# Patient Record
Sex: FEMALE | Race: BLACK OR AFRICAN AMERICAN | NOT HISPANIC OR LATINO | Employment: FULL TIME | URBAN - METROPOLITAN AREA
[De-identification: names, ages, dates, MRNs, and addresses within clinical notes are randomized per-mention and may not be internally consistent; named-entity substitution may affect disease eponyms.]

---

## 2023-10-07 PROBLEM — R35.0 URINE FREQUENCY: Status: ACTIVE | Noted: 2023-10-07

## 2023-10-07 PROBLEM — F32.A DEPRESSION: Status: ACTIVE | Noted: 2023-10-07

## 2023-10-07 PROBLEM — N93.9 ABNORMAL UTERINE BLEEDING (AUB): Status: ACTIVE | Noted: 2023-10-07

## 2023-10-07 PROBLEM — N92.6 IRREGULAR MENSTRUAL CYCLE: Status: ACTIVE | Noted: 2023-10-07

## 2023-10-07 PROBLEM — L70.0 ACNE VULGARIS: Status: ACTIVE | Noted: 2021-08-03

## 2023-10-07 PROBLEM — R10.2 PAIN, PELVIC, FEMALE: Status: ACTIVE | Noted: 2023-10-07

## 2023-10-07 PROBLEM — R07.89 NON-CARDIAC CHEST PAIN: Status: ACTIVE | Noted: 2023-10-07

## 2023-10-07 PROBLEM — G47.30 SLEEP APNEA: Status: ACTIVE | Noted: 2023-10-07

## 2023-10-07 PROBLEM — N91.2 AMENORRHEA: Status: ACTIVE | Noted: 2023-10-07

## 2023-10-07 PROBLEM — M79.18 MUSCULOSKELETAL PAIN: Status: ACTIVE | Noted: 2023-10-07

## 2023-10-07 PROBLEM — R94.31 ABNORMAL ECG: Status: ACTIVE | Noted: 2023-10-07

## 2023-10-07 PROBLEM — R63.2 POLYPHAGIA: Status: ACTIVE | Noted: 2023-10-07

## 2023-10-07 PROBLEM — Z98.84 BARIATRIC SURGERY STATUS: Status: ACTIVE | Noted: 2023-10-07

## 2023-10-07 PROBLEM — K59.00 CONSTIPATION: Status: ACTIVE | Noted: 2023-10-07

## 2023-10-07 PROBLEM — E66.01 MORBID OBESITY (MULTI): Status: ACTIVE | Noted: 2023-10-07

## 2023-10-07 PROBLEM — E55.9 VITAMIN D DEFICIENCY: Status: ACTIVE | Noted: 2023-10-07

## 2023-10-07 RX ORDER — TRETINOIN 0.25 MG/G
1 CREAM TOPICAL
COMMUNITY
Start: 2021-08-03 | End: 2023-10-18 | Stop reason: ALTCHOICE

## 2023-10-07 RX ORDER — ASPIRIN 325 MG
1 TABLET, DELAYED RELEASE (ENTERIC COATED) ORAL
COMMUNITY
Start: 2022-04-20 | End: 2023-10-18 | Stop reason: ALTCHOICE

## 2023-10-07 RX ORDER — CLINDAMYCIN PHOSPHATE 10 UG/ML
1 LOTION TOPICAL
COMMUNITY
Start: 2021-08-03 | End: 2023-10-18 | Stop reason: ALTCHOICE

## 2023-10-07 RX ORDER — SERTRALINE HYDROCHLORIDE 25 MG/1
1 TABLET, FILM COATED ORAL DAILY
COMMUNITY
Start: 2022-04-06 | End: 2023-10-18 | Stop reason: ALTCHOICE

## 2023-10-07 RX ORDER — SENNOSIDES 8.6 MG/1
TABLET ORAL
COMMUNITY
Start: 2022-04-06 | End: 2023-10-18 | Stop reason: ALTCHOICE

## 2023-10-07 RX ORDER — POLYETHYLENE GLYCOL 3350 17 G/17G
17 POWDER, FOR SOLUTION ORAL DAILY
COMMUNITY
Start: 2022-04-06 | End: 2023-10-18 | Stop reason: ALTCHOICE

## 2023-10-09 ENCOUNTER — TELEPHONE (OUTPATIENT)
Dept: ENDOCRINOLOGY | Facility: CLINIC | Age: 33
End: 2023-10-09

## 2023-10-09 ENCOUNTER — ANCILLARY PROCEDURE (OUTPATIENT)
Dept: ENDOCRINOLOGY | Facility: CLINIC | Age: 33
End: 2023-10-09
Payer: COMMERCIAL

## 2023-10-09 ENCOUNTER — LAB (OUTPATIENT)
Dept: LAB | Facility: LAB | Age: 33
End: 2023-10-09
Payer: COMMERCIAL

## 2023-10-09 DIAGNOSIS — Z31.41 ENCOUNTER FOR FERTILITY TESTING: ICD-10-CM

## 2023-10-09 DIAGNOSIS — Z11.3 ENCOUNTER FOR SCREENING FOR INFECTIONS WITH A PREDOMINANTLY SEXUAL MODE OF TRANSMISSION: ICD-10-CM

## 2023-10-09 DIAGNOSIS — Z31.41 ENCOUNTER FOR FERTILITY TESTING: Primary | ICD-10-CM

## 2023-10-09 LAB
ABO GROUP (TYPE) IN BLOOD: NORMAL
ANTIBODY SCREEN: NORMAL
HCV AB SER QL: NONREACTIVE
HIV 1+2 AB+HIV1 P24 AG SERPL QL IA: NONREACTIVE
RH FACTOR (ANTIGEN D): NORMAL
TSH SERPL-ACNC: 1.15 MIU/L (ref 0.44–3.98)

## 2023-10-09 PROCEDURE — 86901 BLOOD TYPING SEROLOGIC RH(D): CPT

## 2023-10-09 PROCEDURE — 36415 COLL VENOUS BLD VENIPUNCTURE: CPT

## 2023-10-09 PROCEDURE — 76830 TRANSVAGINAL US NON-OB: CPT

## 2023-10-09 PROCEDURE — 86803 HEPATITIS C AB TEST: CPT

## 2023-10-09 PROCEDURE — 76830 TRANSVAGINAL US NON-OB: CPT | Performed by: OBSTETRICS & GYNECOLOGY

## 2023-10-09 PROCEDURE — 86900 BLOOD TYPING SEROLOGIC ABO: CPT

## 2023-10-09 PROCEDURE — 86850 RBC ANTIBODY SCREEN: CPT

## 2023-10-09 PROCEDURE — 86780 TREPONEMA PALLIDUM: CPT

## 2023-10-09 PROCEDURE — 87800 DETECT AGNT MULT DNA DIREC: CPT

## 2023-10-09 PROCEDURE — 87340 HEPATITIS B SURFACE AG IA: CPT

## 2023-10-09 PROCEDURE — 87389 HIV-1 AG W/HIV-1&-2 AB AG IA: CPT

## 2023-10-09 PROCEDURE — 83516 IMMUNOASSAY NONANTIBODY: CPT

## 2023-10-09 PROCEDURE — 84443 ASSAY THYROID STIM HORMONE: CPT

## 2023-10-09 NOTE — TELEPHONE ENCOUNTER
Attempted to return patient call. Detailed VM left for patient. Patient instructed that we will need a copy of varicella/Rubella vaccinations. Patient instructed to complete genetic waiver sent via engaged MD. Patient instructed to have partner complete semen analysis. Patient instructed to contact [facility] for consult. Patient instructed to keep follow up appointment with Dr. Benz.   KARY LOCO on 10/9/23 at 3:46 PM.

## 2023-10-10 LAB
C TRACH RRNA SPEC QL NAA+PROBE: NEGATIVE
HBV SURFACE AG SERPL QL IA: NONREACTIVE
N GONORRHOEA DNA SPEC QL PROBE+SIG AMP: NEGATIVE
T PALLIDUM AB SER QL: NONREACTIVE

## 2023-10-11 LAB — MIS SERPL-MCNC: 6.86 NG/ML (ref 0.18–11.71)

## 2023-10-18 ENCOUNTER — INITIAL PRENATAL (OUTPATIENT)
Dept: MATERNAL FETAL MEDICINE | Facility: CLINIC | Age: 33
End: 2023-10-18
Payer: COMMERCIAL

## 2023-10-18 VITALS — WEIGHT: 226.4 LBS | BODY MASS INDEX: 42.78 KG/M2 | SYSTOLIC BLOOD PRESSURE: 132 MMHG | DIASTOLIC BLOOD PRESSURE: 85 MMHG

## 2023-10-18 DIAGNOSIS — F17.200 TOBACCO USE DISORDER: ICD-10-CM

## 2023-10-18 DIAGNOSIS — E66.01 MORBID OBESITY (MULTI): ICD-10-CM

## 2023-10-18 DIAGNOSIS — Z31.69 ENCOUNTER FOR PRECONCEPTION CONSULTATION: Primary | ICD-10-CM

## 2023-10-18 DIAGNOSIS — Z87.59 HISTORY OF PRE-ECLAMPSIA: ICD-10-CM

## 2023-10-18 DIAGNOSIS — R79.9 ABNORMAL BLOOD FINDINGS: ICD-10-CM

## 2023-10-18 DIAGNOSIS — R73.03 PREDIABETES: ICD-10-CM

## 2023-10-18 DIAGNOSIS — G47.33 OBSTRUCTIVE SLEEP APNEA SYNDROME: ICD-10-CM

## 2023-10-18 PROCEDURE — 99205 OFFICE O/P NEW HI 60 MIN: CPT | Performed by: STUDENT IN AN ORGANIZED HEALTH CARE EDUCATION/TRAINING PROGRAM

## 2023-10-18 PROCEDURE — 99215 OFFICE O/P EST HI 40 MIN: CPT | Mod: GC | Performed by: STUDENT IN AN ORGANIZED HEALTH CARE EDUCATION/TRAINING PROGRAM

## 2023-10-18 NOTE — PROGRESS NOTES
MFM Fellow Note: OB High-[facility] Clinic  Initial Visit  10/18/2023    Ms. Tonya Lazo is a 33 y.o.  for preconception consult prior to possible tubal reversal vs IVF.   She was referred by her [facility] specialist Dr. Benz   Today patient has no complaints. She is in her normal state of health.     ROS  See HPI. 14 pt ROS otherwise negative.    Past Medical History:   Diagnosis Date    Abnormal uterine and vaginal bleeding, unspecified 2020    Abnormal uterine bleeding (AUB)    Anemia, unspecified 2013    Anemia    Constipation, unspecified 2022    Constipation    Depression, unspecified 2022    Depression    Encounter for gynecological examination (general) (routine) without abnormal findings 2022    Well woman exam    Encounter for screening for malignant neoplasm of cervix 2022    Cervical cancer screening    Rhabdomyolysis     Rhabdomyolysis    Unspecified asthma, uncomplicated 2013    Asthma    Unspecified pre-eclampsia, unspecified trimester     Preeclampsia    Vitamin D deficiency, unspecified 2022    Vitamin D deficiency       Past Surgical History:   Procedure Laterality Date    TUBAL LIGATION Bilateral     Tubal Ligation       OB History    Para Term  AB Living   3 3 2 1   3   SAB IAB Ectopic Multiple Live Births           3      # Outcome Date GA Lbr Dax/2nd Weight Sex Delivery Anes PTL Lv   3 Term      Vag-Spont   CINTHYA   2  2010 36w0d    Vag-Spont   CINTHYA      Birth Comments: had preeclamplsia in pregnancy   1 Term      Vag-Spont   CINTHYA       Social History     Socioeconomic History    Marital status:      Spouse name: Not on file    Number of children: Not on file    Years of education: Not on file    Highest education level: Not on file   Occupational History    Not on file   Tobacco Use    Smoking status: Every Day     Packs/day: .25     Types: Cigarettes    Smokeless tobacco: Never   Vaping Use    Vaping Use:  Never used   Substance and Sexual Activity    Alcohol use: Yes     Alcohol/week: 1.0 standard drink of alcohol     Types: 1 Glasses of wine per week    Drug use: Never    Sexual activity: Not on file   Other Topics Concern    Not on file   Social History Narrative    Not on file     Social Determinants of Health     Financial Resource Strain: Not on file   Food Insecurity: Not on file   Transportation Needs: Not on file   Physical Activity: Not on file   Stress: Not on file   Social Connections: Not on file   Intimate Partner Violence: Not on file   Housing Stability: Not on file       Family History   Problem Relation Name Age of Onset    Diabetes Other maternal relatives     Hypertension Other maternal relatives     Diabetes Other paternal relatives     Hypertension Other paternal relatives        Physical Exam  Vitals:    10/18/23 1400   BP: 132/85     General: NAD  CV: RRR  Respiratory: No increased work of breathing noted  Pelvic exam: deferred    Assessment and Plan:    #Preconception visit  - Patient seeing [facility] specialist for possible tubal re-anastamosis vs IVF  - She has no contraindications to pregnancy, but reviewed her medical problems (below) and risks associated with each, along with recommendations to optimize her health prior to conception  - Would recommend MFM consult early in next pregnancy   - If conceives via IVF, recommended fetal ECHO in addition to the testing recommended below     1) AMA  - Reviewed if she conceives, would be considered advanced maternal age, which is associated with an increased risk of aneuploidy, spontaneous mult gestation, SAB, GDM and preE  - Would recommend genetic counseling for detailed counseling, testing as desired/appropriate and Level II ultrasound in pregnancy  - Due to her additional risk factors for preE, should start ASA 162mg daily starting at 12wks gestation     2) Obesity (BMI 42)  - In class III obesity, there is an increased risk of SAB, congenital  anomalies (due to missed diagnosis in [place]), preE, GDM and still birth in pregnancy, therefore recommended weight loss prior to pregnancy  - She is currently seeing a weight loss specialist at [facility]  - In pregnancy, would require 3rd trimester  testing if BMI>40    3) Pre-DM  - A1c 6.0 in   - Recommend repeat A1c, and if >6.5, further management for DM    4) Current Everyday Smoker  - Currently smoking  - 1/ ppd  - Connected with program at [facility] to assist with cessation. Congratulated the patient and recommended cessation prior to pregnancy   - The risks of smoking in pregnancy include PTB, fetal growth restriction, placental abruption, and PPROM    5) Hx of Preeclampsia   - Due to her history of preeclampsia, she is at risk for preeclampsia again in a future pregnancy  - In pregnancy, would start ASA 162mg daily starting at 12 weeks gestation and obtain baseline preE labs    6) NINOSKA  - The patient has a history of mild NINOSKA. She states she has a repeat sleep study scheduled in the next month at [facility]  - If pregnant, recommend continuing CPAP use    7) Hx Rhabdomyolysis    - patient states in  pregnancy was diagnosed with rhabdomyolysis postpartum, with symptoms of lower extremity weakness and numbness. She states she required cardiology consultation and ~1 week admission due to electrolyte changes and possible cardiomyopathy   - Unclear etiology of the episode, and no symptoms in her subsequent pregnancy   - No records are available to review  - Prior to pregnancy, recommend obtaining ECHO and cardiology consult to determine if residual cardiac changes     Thank you for allowing us to participate in the care of your patient. If patient becomes pregnant, recommend early MFM consult but may follow with obstetrician of her choice. Otherwise, please do not hesitate to contact us should any concerns arise    Patient seen and discussed with Dr. Forbes.    Poly Archer MD  Fellow,  Maternal-Fetal Medicine    I saw and evaluated the patient. I personally obtained the key and critical portions of the history and physical exam or was physically present for key and critical portions performed by the resident/fellow. I reviewed the resident/fellow's documentation and discussed the patient with the resident/fellow. I agree with the resident/fellow's medical decision making as documented in the note.    Patient here for preconception visit. H/o rhabdo during a pregnancy though no records are available. We reviewed that in the absence of records it is difficult to determine the clinical scenario and estimate a risk of recurrence. She did have an uncomplicated pregnancy after this one which is reassuring. Recommend echo and cardiology visit. We recommend she optimize her health prior to pregnancy including weight loss, management of her pre-diabetes, and smoking cessation.   During pregnancy we recommend the following:  - Early dating ultrasound  - Genetics counseling for aneuploidy screening and a nuchal translucency evaluation at 13 weeks  -  starting in the first trimester  - Level 2 ultrasound at 20 weeks and if she has IVF recommend fetal echo  - If her BMI is >40 at time of pregnancy OR she has IVF recommend growth at 30 and 36 weeks and weekly  testing starting at 34 weeks    I spent 60 minutes in the professional and overall care of this patient.    Kennedy Forbes MD

## 2023-10-30 ENCOUNTER — TELEMEDICINE (OUTPATIENT)
Dept: ENDOCRINOLOGY | Facility: CLINIC | Age: 33
End: 2023-10-30
Payer: COMMERCIAL

## 2023-10-30 VITALS — HEIGHT: 61 IN | BODY MASS INDEX: 42.67 KG/M2 | WEIGHT: 226 LBS

## 2023-10-30 ASSESSMENT — PAIN SCALES - GENERAL: PAINLEVEL: 0-NO PAIN

## 2023-10-30 NOTE — PROGRESS NOTES
"Today's visit was provided through telemedicine conferencing: Using the Epic platform.    Consent:  The concept of \"telemedicine\" has been described to the patient.  Patient has been informed of the anticipated benefits and possible risks.  Patient understands the information provided regarding telemedicine, has had the opportunity to ask questions about this information, and all questions have been answered to patient's satisfaction. Patient consents for the use of telemedicine in his/her medical care and authorizes the transmission of any relevant medical information to providers and their staff involved in patient's medical or mental health care.    The location of the patient : home  The location of the provider:  office    The following staff and their role did participate in today's encounter visit:   Guillermo Benz MD    Virtual Visit    Follow Up Visit HPI    Patient is a 33 y.o.  who presents today for follow-up.  She desires to proceed with fallopian tube reversal.  Results of her fertility testing today are as seen below.  The patient has also been to see maternal-fetal medicine, see their note in EMR for further details.  I do recommend that the patient proceed with cardiology evaluation and echocardiogram prior to proceeding with pregnancy.  The patient does follow with cardiology here at , and reports that she will be setting up an appointment with them to obtain recommendations.    Reports that the patient's prior tubal ligation surgery from [facility] are not readily available as they predate EMR.    Testing to date:   Result Date Done   Type and Screen: B 10/9/2023   Rh: POS 10/9/2023   TSH: 1.15 (Ref range: 0.44 - 3.98 mIU/L) 10/9/2023   AMH: 6.859 (Ref range: 0.176 - 11.705 ng/mL) 10/9/2023   Hepatitis B surface antigen: Nonreactive (Ref range: Nonreactive) 10/9/2023   Hepatitis C antibody: Nonreactive (Ref range: Nonreactive) 10/9/2023   HIV ½ Antigen Antibody screen with reflex: Nonreactive " (Ref range: Nonreactive) 10/9/2023   Syphilis screening with reflex: negative 10/9/2023     GYN Pelvic Ultrasound: US PELVIS TRANSVAGINAL (10/9/2023):  Simple cysts appreciated on the right ovary (1 measuring 3 and 1 measuring 4 cm), otherwise negative study    Partner SA:  Ted Ignacio  Component      Latest Ref Rng 10/20/2023   Volume (Semen)      1.5 mL 0.45 !    Concentration(Semen)      15 mill/mL 128.44    Total Motility (Semen)      40 % 38 !    Prog. Motility (Semen)      32 % 35    Prog. Motility (Semen)      % 3    Total No of Sperm (Semen)      39 mill 57.80    Total No of Motile (Semen)      mill 22.07    Total No of Rnd Cells (Semen)      5 mill 10.0 !    Leukocyte (Semen) Negative    % Normal (Semen)      4 % 4.0    % Head defects (Semen)      % 95.8    % Neck Midpiece (Semen)      % 29.3    % Tail defects (Semen)      % 13.8    % Ex Residual Cytoplasm (Semen)      % 1.8    Tot. No of Norm. Motile Sperm (Semen)      mill 2.312    Tot. No of Norm. Sperm (Semen)      mill 0.883           Past Medical History:   Diagnosis Date    Abnormal uterine and vaginal bleeding, unspecified 03/06/2020    Abnormal uterine bleeding (AUB)    Anemia, unspecified 07/21/2013    Anemia    Constipation, unspecified 04/06/2022    Constipation    Depression, unspecified 04/06/2022    Depression    Encounter for gynecological examination (general) (routine) without abnormal findings 04/06/2022    Well woman exam    Encounter for screening for malignant neoplasm of cervix 04/06/2022    Cervical cancer screening    NINOSKA (obstructive sleep apnea)     Rhabdomyolysis     Rhabdomyolysis    Unspecified asthma, uncomplicated 07/21/2013    Asthma    Unspecified pre-eclampsia, unspecified trimester     Preeclampsia    Vitamin D deficiency, unspecified 04/20/2022    Vitamin D deficiency     Past Surgical History:   Procedure Laterality Date    TUBAL LIGATION Bilateral 2013    Tubal Ligation     No current outpatient medications on  "file prior to visit.     No current facility-administered medications on file prior to visit.       BMI:   BMI Readings from Last 1 Encounters:   10/30/23 42.70 kg/m²     VITALS:  Ht 1.549 m (5' 1\")   Wt 103 kg (226 lb)   LMP 10/09/2023 (Approximate)   BMI 42.70 kg/m²   LMP: Patient's last menstrual period was 10/09/2023 (approximate).    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  Results of this couples work-up were discussed at length, and all questions were answered to the patient's satisfaction.  Pros and cons of proceeding with fallopian tube reversal versus other fertility treatments such as IVF were reviewed.  Patient reports that she is interested in fallopian tube reanastomosis.  Pros and cons of proceeding with open fallopian tube reanastomosis versus laparoscopic approach were further discussed, and the patient reports that she is interested in open fallopian tube reanastomosis.  She understands that our ability to complete this procedure will ultimately depend upon intraoperative findings, and that we may not be able to complete repair of 1 or both fallopian tubes.  Patient verbalized understanding of this.  We will submit a booking sheet to our office surgical scheduler for this procedure, plan on having the patient return for a preoperative appointment with me.    In the meantime, the patient is to follow-up with her cardiologist and obtain perioperative recommendations/for stratification, complete echocardiogram as requested by M.    PLAN  [X ] we will submit a booking sheet to our offices surgical scheduler for open fallopian tube anastomosis  [X ] patient to follow-up with cardiology to obtain perioperative recommendations/risk stratification, complete echocardiogram      FOLLOW UP   [X ] patient to follow-up with me for preoperative appointment once procedure has been scheduled and work-up is completed    Guillermo " EVELYNE Benz  10/30/2023  3:17 PM

## 2023-11-17 ENCOUNTER — APPOINTMENT (OUTPATIENT)
Dept: CARDIOLOGY | Facility: CLINIC | Age: 33
End: 2023-11-17
Payer: COMMERCIAL

## 2023-11-21 ENCOUNTER — PREP FOR PROCEDURE (OUTPATIENT)
Dept: ENDOCRINOLOGY | Facility: CLINIC | Age: 33
End: 2023-11-21
Payer: COMMERCIAL

## 2023-11-21 DIAGNOSIS — N97.1 TUBAL OCCLUSION: Primary | ICD-10-CM

## 2023-11-21 RX ORDER — HEPARIN SODIUM 5000 [USP'U]/ML
5000 INJECTION, SOLUTION INTRAVENOUS; SUBCUTANEOUS ONCE
Status: CANCELLED | OUTPATIENT
Start: 2023-11-21 | End: 2023-11-21

## 2023-11-21 RX ORDER — CELECOXIB 50 MG/1
400 CAPSULE ORAL ONCE
Status: CANCELLED | OUTPATIENT
Start: 2023-11-21 | End: 2023-11-21

## 2023-11-21 RX ORDER — PHENAZOPYRIDINE HYDROCHLORIDE 200 MG/1
200 TABLET, FILM COATED ORAL ONCE
Status: CANCELLED | OUTPATIENT
Start: 2023-11-21 | End: 2023-11-21

## 2023-11-21 RX ORDER — ACETAMINOPHEN 325 MG/1
975 TABLET ORAL ONCE
Status: CANCELLED | OUTPATIENT
Start: 2023-11-21 | End: 2023-11-21

## 2023-11-21 RX ORDER — GABAPENTIN 600 MG/1
600 TABLET ORAL ONCE
Status: CANCELLED | OUTPATIENT
Start: 2023-11-21 | End: 2023-11-21

## 2023-11-30 ENCOUNTER — HOSPITAL ENCOUNTER (OUTPATIENT)
Facility: HOSPITAL | Age: 33
Setting detail: OUTPATIENT SURGERY
End: 2023-11-30
Attending: OBSTETRICS & GYNECOLOGY | Admitting: OBSTETRICS & GYNECOLOGY
Payer: COMMERCIAL

## 2023-11-30 PROBLEM — N97.1 TUBAL OCCLUSION: Status: ACTIVE | Noted: 2023-11-21

## 2024-04-16 ENCOUNTER — OFFICE VISIT (OUTPATIENT)
Dept: OBSTETRICS AND GYNECOLOGY | Facility: CLINIC | Age: 34
End: 2024-04-16
Payer: COMMERCIAL

## 2024-04-16 VITALS — WEIGHT: 222 LBS | HEIGHT: 61 IN | BODY MASS INDEX: 41.91 KG/M2

## 2024-04-16 DIAGNOSIS — R10.2 PELVIC PAIN: Primary | ICD-10-CM

## 2024-04-16 PROCEDURE — 99214 OFFICE O/P EST MOD 30 MIN: CPT | Performed by: OBSTETRICS & GYNECOLOGY
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
## 2024-04-17 ENCOUNTER — HOSPITAL ENCOUNTER (OUTPATIENT)
Dept: RADIOLOGY | Facility: CLINIC | Age: 34
Discharge: HOME | End: 2024-04-17
Payer: COMMERCIAL

## 2024-04-17 DIAGNOSIS — R10.2 PELVIC PAIN: ICD-10-CM

## 2024-04-17 PROCEDURE — 76856 US EXAM PELVIC COMPLETE: CPT | Performed by: RADIOLOGY

## 2024-04-17 PROCEDURE — 76856 US EXAM PELVIC COMPLETE: CPT

## 2024-04-17 PROCEDURE — 76830 TRANSVAGINAL US NON-OB: CPT | Performed by: RADIOLOGY

## 2024-04-23 ENCOUNTER — OFFICE VISIT (OUTPATIENT)
Dept: OBSTETRICS AND GYNECOLOGY | Facility: CLINIC | Age: 34
End: 2024-04-23
Payer: COMMERCIAL

## 2024-04-23 VITALS
WEIGHT: 220 LBS | BODY MASS INDEX: 41.54 KG/M2 | SYSTOLIC BLOOD PRESSURE: 134 MMHG | HEIGHT: 61 IN | DIASTOLIC BLOOD PRESSURE: 69 MMHG

## 2024-04-23 DIAGNOSIS — R10.2 PELVIC PAIN: Primary | ICD-10-CM

## 2024-04-23 PROCEDURE — 99214 OFFICE O/P EST MOD 30 MIN: CPT | Performed by: OBSTETRICS & GYNECOLOGY
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
## 2025-08-06 PROCEDURE — 99283 EMERGENCY DEPT VISIT LOW MDM: CPT | Performed by: EMERGENCY MEDICINE

## 2025-08-07 ENCOUNTER — HOSPITAL ENCOUNTER (EMERGENCY)
Facility: HOSPITAL | Age: 35
Discharge: HOME | End: 2025-08-07
Attending: EMERGENCY MEDICINE
Payer: COMMERCIAL

## 2025-08-07 VITALS
RESPIRATION RATE: 16 BRPM | TEMPERATURE: 97.7 F | WEIGHT: 240 LBS | OXYGEN SATURATION: 100 % | SYSTOLIC BLOOD PRESSURE: 124 MMHG | HEIGHT: 61 IN | DIASTOLIC BLOOD PRESSURE: 82 MMHG | BODY MASS INDEX: 45.31 KG/M2 | HEART RATE: 74 BPM

## 2025-08-07 DIAGNOSIS — K92.1 BLOOD IN STOOL: Primary | ICD-10-CM

## 2025-08-07 LAB
ABO GROUP (TYPE) IN BLOOD: NORMAL
ALBUMIN SERPL BCP-MCNC: 4.3 G/DL (ref 3.4–5)
ALP SERPL-CCNC: 39 U/L (ref 33–110)
ALT SERPL W P-5'-P-CCNC: 22 U/L (ref 7–45)
ANION GAP SERPL CALC-SCNC: 14 MMOL/L (ref 10–20)
ANTIBODY SCREEN: NORMAL
AST SERPL W P-5'-P-CCNC: 18 U/L (ref 9–39)
BASOPHILS # BLD MANUAL: 0 X10*3/UL (ref 0–0.1)
BASOPHILS NFR BLD MANUAL: 0 %
BILIRUB SERPL-MCNC: 0.4 MG/DL (ref 0–1.2)
BLASTS # BLD MANUAL: 0 X10*3/UL
BLASTS NFR BLD MANUAL: 0 %
BUN SERPL-MCNC: 13 MG/DL (ref 6–23)
CALCIUM SERPL-MCNC: 9.5 MG/DL (ref 8.6–10.6)
CHLORIDE SERPL-SCNC: 105 MMOL/L (ref 98–107)
CO2 SERPL-SCNC: 23 MMOL/L (ref 21–32)
CREAT SERPL-MCNC: 0.62 MG/DL (ref 0.5–1.05)
EGFRCR SERPLBLD CKD-EPI 2021: >90 ML/MIN/1.73M*2
EOSINOPHIL # BLD MANUAL: 0.23 X10*3/UL (ref 0–0.7)
EOSINOPHIL NFR BLD MANUAL: 3.4 %
ERYTHROCYTE [DISTWIDTH] IN BLOOD BY AUTOMATED COUNT: 15.8 % (ref 11.5–14.5)
GLUCOSE SERPL-MCNC: 113 MG/DL (ref 74–99)
HCT VFR BLD AUTO: 32.6 % (ref 36–46)
HGB BLD-MCNC: 9.8 G/DL (ref 12–16)
HYPOCHROMIA BLD QL SMEAR: NORMAL
IMM GRANULOCYTES # BLD AUTO: 0.02 X10*3/UL (ref 0–0.7)
IMM GRANULOCYTES NFR BLD AUTO: 0.3 % (ref 0–0.9)
LYMPHOCYTES # BLD MANUAL: 4.07 X10*3/UL (ref 1.2–4.8)
LYMPHOCYTES NFR BLD MANUAL: 60.7 %
MCH RBC QN AUTO: 19.7 PG (ref 26–34)
MCHC RBC AUTO-ENTMCNC: 30.1 G/DL (ref 32–36)
MCV RBC AUTO: 66 FL (ref 80–100)
METAMYELOCYTES # BLD MANUAL: 0 X10*3/UL
METAMYELOCYTES NFR BLD MANUAL: 0 %
MONOCYTES # BLD MANUAL: 0.23 X10*3/UL (ref 0.1–1)
MONOCYTES NFR BLD MANUAL: 3.4 %
MYELOCYTES # BLD MANUAL: 0 X10*3/UL
MYELOCYTES NFR BLD MANUAL: 0 %
NEUTROPHILS # BLD MANUAL: 2.12 X10*3/UL (ref 1.2–7.7)
NEUTS BAND # BLD MANUAL: 0 X10*3/UL (ref 0–0.7)
NEUTS BAND NFR BLD MANUAL: 0 %
NEUTS SEG # BLD MANUAL: 2.12 X10*3/UL (ref 1.2–7)
NEUTS SEG NFR BLD MANUAL: 31.6 %
NRBC BLD MANUAL-RTO: 0 % (ref 0–0)
NRBC BLD-RTO: 0 /100 WBCS (ref 0–0)
OVALOCYTES BLD QL SMEAR: NORMAL
PLASMA CELLS # BLD MANUAL: 0 X10*3/UL
PLASMA CELLS NFR BLD MANUAL: 0 %
PLATELET # BLD AUTO: 292 X10*3/UL (ref 150–450)
POLYCHROMASIA BLD QL SMEAR: NORMAL
POTASSIUM SERPL-SCNC: 3.8 MMOL/L (ref 3.5–5.3)
PROMYELOCYTES # BLD MANUAL: 0.06 X10*3/UL
PROMYELOCYTES NFR BLD MANUAL: 0.9 %
PROT SERPL-MCNC: 7.6 G/DL (ref 6.4–8.2)
RBC # BLD AUTO: 4.97 X10*6/UL (ref 4–5.2)
RBC MORPH BLD: NORMAL
RH FACTOR (ANTIGEN D): NORMAL
SODIUM SERPL-SCNC: 138 MMOL/L (ref 136–145)
TOTAL CELLS COUNTED BLD: 117
VARIANT LYMPHS # BLD MANUAL: 0 X10*3/UL (ref 0–0.5)
VARIANT LYMPHS NFR BLD: 0 %
WBC # BLD AUTO: 6.7 X10*3/UL (ref 4.4–11.3)
WBC OTHER # BLD MANUAL: 0 X10*3/UL
WBC OTHER NFR BLD MANUAL: 0 %

## 2025-08-07 PROCEDURE — 80053 COMPREHEN METABOLIC PANEL: CPT

## 2025-08-07 PROCEDURE — 86901 BLOOD TYPING SEROLOGIC RH(D): CPT

## 2025-08-07 PROCEDURE — 85007 BL SMEAR W/DIFF WBC COUNT: CPT

## 2025-08-07 PROCEDURE — 36415 COLL VENOUS BLD VENIPUNCTURE: CPT

## 2025-08-07 PROCEDURE — 85027 COMPLETE CBC AUTOMATED: CPT

## 2025-08-07 ASSESSMENT — PAIN - FUNCTIONAL ASSESSMENT: PAIN_FUNCTIONAL_ASSESSMENT: 0-10

## 2025-08-07 ASSESSMENT — PAIN SCALES - GENERAL: PAINLEVEL_OUTOF10: 0 - NO PAIN

## 2025-08-07 NOTE — DISCHARGE INSTRUCTIONS
We are giving you a referral for gastroenterology.  Please follow-up with them.  Please return to emergency room if if you have any worsening bowel movements with significant amount of blood, you have any chest pain, shortness of breath, dizziness lightheadedness

## 2025-08-07 NOTE — Clinical Note
Tonya Lazo was seen and treated in our emergency department on 8/6/2025.  She may return to work on 08/08/2025.  I saw the patient and she was here very late in the emergency room.  Her boyfriend Elian Vicente was here and should be excused from work as well if possible.     If you have any questions or concerns, please don't hesitate to call.      Natasha Wild MD

## 2025-08-07 NOTE — ED PROVIDER NOTES
History of Present Illness     History provided by: Patient  Limitations to History: None  External Records Reviewed with Brief Summary: None    HPI:  Tonya Lazo is a 35 y.o. female with PMH of preDM, HLD, NINOSKA, obesity, asthma, and Vitamin D deficiency presents emergency room for bloody stools.  Patient states that she had 2 bowel movements in the past 24 hours where she had bright red blood when she wipes.  Patient was unable to exactly say how much blood however less than a cup from what she told me.  Patient states that she is not having any chest pain, shortness of breath, lightheadedness, dizziness, abdominal pain or nausea or vomiting.  Patient states that this is never happened to her before and wants to be evaluated by the ED for this new symptom.      Physical Exam   Triage vitals:  T 36.5 °C (97.7 °F)  HR 74  /82  RR 16  O2 100 % None (Room air)    General: Awake, alert, in no acute distress, obese  Eyes: Gaze conjugate.  No scleral icterus or injection  HENT: Normo-cephalic, atraumatic. No stridor  CV: Regular rate, regular rhythm. Radial pulses 2+ bilaterally  Resp: Breathing non-labored, speaking in full sentences.  Clear to auscultation bilaterally  GI: Soft, non-distended, non-tender. No rebound or guarding.  : Rectal exam was chaperoned by nurse, no external hemorrhoids visualized, no anal fissure that is visualized, no bleeding per rectum, no masses that could be felt  MSK/Extremities: No gross bony deformities. Moving all extremities  Skin: Warm. Appropriate color  Neuro: Alert. Oriented. Face symmetric. Speech is fluent.  Gross strength and sensation intact in b/l UE and LEs  Psych: Appropriate mood and affect    Medical Decision Making & ED Course   Medical Decision Makin y.o. female with PMH of preDM, HLD, NINOSKA, obesity, asthma, and Vitamin D deficiency presents emergency room for bloody stools.  Patient presents vitally stable, no acute distress,  nontoxic-appearing.    This patient presents with symptoms concerning for a lower GI bleed. Differential diagnoses include diverticulitis (most common cause) versus hemorrhoids. Less likely etiologies include angiodysplasia, cancer, IBD. Presentation not consistent with mesenteric ischemia or ischemic colitis, brisk or life threatening upper GIB as patient has no evidence of hemorrhagic shock. Plan to check labs to evaluate the extent of bleeding, including H/H. Will consent patient for blood and transfuse to goal Hb of >7 if necessary. No indication for abdominal imaging at this time.    Plan: labs, close hemodynamic monitoring, serial reassessment    Basic labs show no acute electrolyte abnormalities.  Hemoglobin is stable and similar that it has been compared to 3 years ago.  Patient was reevaluated and does not have any abdominal pain and appears to be stable.  Therefore patient will be given GI follow-up and told to come back to emergency room if she has any new or worsening symptoms.    ----  Scoring Tools Utilized: none     Social Determinants of Health which Significantly Impact Care: None identified The following actions were taken to address these social determinants: None    EKG Independent Interpretation: EKG not obtained    Independent Result Review and Interpretation: Relevant laboratory and radiographic results were reviewed and independently interpreted by myself.  As necessary, they are commented on in the ED Course.    Chronic conditions affecting the patient's care: As documented above in [facility]    The patient was discussed with the following consultants/services: None    Care Considerations: As documented above in [facility]    ED Course:  Diagnoses as of 08/09/25 0414   Blood in stool     Disposition   As a result of the work-up, the patient was discharged home.  she was informed of her diagnosis and instructed to come back with any concerns or worsening of condition.  she and was agreeable to the plan as  discussed above.  she was given the opportunity to ask questions.  All of the patient's questions were answered.    Procedures   Procedures    Patient seen and discussed with ED attending physician.    Natasha Wild MD  Emergency Medicine     Natasha Wild MD  Resident  08/09/25 [id number]